# Patient Record
Sex: FEMALE | Race: WHITE | Employment: PART TIME | URBAN - METROPOLITAN AREA
[De-identification: names, ages, dates, MRNs, and addresses within clinical notes are randomized per-mention and may not be internally consistent; named-entity substitution may affect disease eponyms.]

---

## 2018-09-03 ENCOUNTER — HOSPITAL ENCOUNTER (OUTPATIENT)
Age: 63
Setting detail: OBSERVATION
Discharge: HOME | End: 2018-09-04
Attending: INTERNAL MEDICINE | Admitting: INTERNAL MEDICINE
Payer: COMMERCIAL

## 2018-09-03 ENCOUNTER — APPOINTMENT (OUTPATIENT)
Dept: CT IMAGING | Age: 63
End: 2018-09-03
Payer: COMMERCIAL

## 2018-09-03 DIAGNOSIS — S01.01XA LACERATION OF SCALP, INITIAL ENCOUNTER: ICD-10-CM

## 2018-09-03 DIAGNOSIS — R11.2 NAUSEA AND VOMITING, INTRACTABILITY OF VOMITING NOT SPECIFIED, UNSPECIFIED VOMITING TYPE: ICD-10-CM

## 2018-09-03 DIAGNOSIS — S09.90XA INJURY OF HEAD, INITIAL ENCOUNTER: ICD-10-CM

## 2018-09-03 DIAGNOSIS — F10.920 ALCOHOLIC INTOXICATION WITHOUT COMPLICATION (HCC): ICD-10-CM
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
## 2018-09-03 LAB
ALBUMIN SERPL-MCNC: 4.9 G/DL (ref 3.9–4.9)
ALP BLD-CCNC: 124 U/L (ref 40–130)
ALT SERPL-CCNC: 17 U/L (ref 0–33)
ANION GAP SERPL CALCULATED.3IONS-SCNC: 21 MEQ/L (ref 7–13)
APTT: 25.9 SEC (ref 21.6–35.4)
AST SERPL-CCNC: 19 U/L (ref 0–35)
BACTERIA: ABNORMAL /HPF
BASOPHILS ABSOLUTE: 0.1 K/UL (ref 0–0.2)
BASOPHILS RELATIVE PERCENT: 0.7 %
BILIRUB SERPL-MCNC: 0.4 MG/DL (ref 0–1.2)
BILIRUBIN URINE: NEGATIVE
BLOOD, URINE: ABNORMAL
BUN BLDV-MCNC: 9 MG/DL (ref 8–23)
CALCIUM SERPL-MCNC: 9.5 MG/DL (ref 8.6–10.2)
CHLORIDE BLD-SCNC: 95 MEQ/L (ref 98–107)
CLARITY: CLEAR
CO2: 20 MEQ/L (ref 22–29)
COLOR: YELLOW
CREAT SERPL-MCNC: 0.58 MG/DL (ref 0.5–0.9)
EKG ATRIAL RATE: 94 BPM
EKG P AXIS: 63 DEGREES
EKG P-R INTERVAL: 162 MS
EKG Q-T INTERVAL: 408 MS
EKG QRS DURATION: 86 MS
EKG QTC CALCULATION (BAZETT): 510 MS
EKG R AXIS: 17 DEGREES
EKG T AXIS: 57 DEGREES
EKG VENTRICULAR RATE: 94 BPM
EOSINOPHILS ABSOLUTE: 0.3 K/UL (ref 0–0.7)
EOSINOPHILS RELATIVE PERCENT: 2.5 %
EPITHELIAL CELLS, UA: ABNORMAL /HPF
ETHANOL PERCENT: 0.16 G/DL
ETHANOL: 181 MG/DL (ref 0–0.08)
GFR AFRICAN AMERICAN: >60
GFR NON-AFRICAN AMERICAN: >60
GLOBULIN: 2.9 G/DL (ref 2.3–3.5)
GLUCOSE BLD-MCNC: 118 MG/DL (ref 74–109)
GLUCOSE URINE: NEGATIVE MG/DL
HCT VFR BLD CALC: 43.8 % (ref 37–47)
HEMOGLOBIN: 14.7 G/DL (ref 12–16)
INR BLD: 1
KETONES, URINE: NEGATIVE MG/DL
LEUKOCYTE ESTERASE, URINE: NEGATIVE
LYMPHOCYTES ABSOLUTE: 3.5 K/UL (ref 1–4.8)
LYMPHOCYTES RELATIVE PERCENT: 32.1 %
MAGNESIUM: 1.7 MG/DL (ref 1.7–2.3)
MCH RBC QN AUTO: 30.1 PG (ref 27–31.3)
MCHC RBC AUTO-ENTMCNC: 33.6 % (ref 33–37)
MCV RBC AUTO: 89.5 FL (ref 82–100)
MONOCYTES ABSOLUTE: 0.6 K/UL (ref 0.2–0.8)
MONOCYTES RELATIVE PERCENT: 5.3 %
NEUTROPHILS ABSOLUTE: 6.5 K/UL (ref 1.4–6.5)
NEUTROPHILS RELATIVE PERCENT: 59.4 %
NITRITE, URINE: NEGATIVE
PDW BLD-RTO: 14.8 % (ref 11.5–14.5)
PH UA: 5 (ref 5–9)
PLATELET # BLD: 175 K/UL (ref 130–400)
POTASSIUM SERPL-SCNC: 3.1 MEQ/L (ref 3.5–5.1)
PROTEIN UA: NEGATIVE MG/DL
PROTHROMBIN TIME: 10.2 SEC (ref 9.6–12.3)
RBC # BLD: 4.89 M/UL (ref 4.2–5.4)
RBC UA: ABNORMAL /HPF (ref 0–2)
SODIUM BLD-SCNC: 136 MEQ/L (ref 132–144)
SPECIFIC GRAVITY UA: 1 (ref 1–1.03)
TOTAL PROTEIN: 7.8 G/DL (ref 6.4–8.1)
TROPONIN: <0.01 NG/ML (ref 0–0.01)
URINE REFLEX TO CULTURE: YES
UROBILINOGEN, URINE: 0.2 E.U./DL
WBC # BLD: 10.9 K/UL (ref 4.8–10.8)
WBC UA: ABNORMAL /HPF (ref 0–5)

## 2018-09-03 PROCEDURE — 85730 THROMBOPLASTIN TIME PARTIAL: CPT

## 2018-09-03 PROCEDURE — 70450 CT HEAD/BRAIN W/O DYE: CPT

## 2018-09-03 PROCEDURE — 2500000003 HC RX 250 WO HCPCS: Performed by: PHYSICIAN ASSISTANT

## 2018-09-03 PROCEDURE — 36415 COLL VENOUS BLD VENIPUNCTURE: CPT

## 2018-09-03 PROCEDURE — 85610 PROTHROMBIN TIME: CPT

## 2018-09-03 PROCEDURE — 96376 TX/PRO/DX INJ SAME DRUG ADON: CPT

## 2018-09-03 PROCEDURE — 87086 URINE CULTURE/COLONY COUNT: CPT

## 2018-09-03 PROCEDURE — 2580000003 HC RX 258: Performed by: PHYSICIAN ASSISTANT

## 2018-09-03 PROCEDURE — 83735 ASSAY OF MAGNESIUM: CPT

## 2018-09-03 PROCEDURE — 85025 COMPLETE CBC W/AUTO DIFF WBC: CPT

## 2018-09-03 PROCEDURE — 81001 URINALYSIS AUTO W/SCOPE: CPT

## 2018-09-03 PROCEDURE — 84484 ASSAY OF TROPONIN QUANT: CPT

## 2018-09-03 PROCEDURE — 80053 COMPREHEN METABOLIC PANEL: CPT

## 2018-09-03 PROCEDURE — G0480 DRUG TEST DEF 1-7 CLASSES: HCPCS

## 2018-09-03 PROCEDURE — 72125 CT NECK SPINE W/O DYE: CPT

## 2018-09-03 PROCEDURE — 6370000000 HC RX 637 (ALT 250 FOR IP): Performed by: PHYSICIAN ASSISTANT

## 2018-09-03 PROCEDURE — 96361 HYDRATE IV INFUSION ADD-ON: CPT

## 2018-09-03 PROCEDURE — 6360000002 HC RX W HCPCS: Performed by: PHYSICIAN ASSISTANT

## 2018-09-03 PROCEDURE — 99285 EMERGENCY DEPT VISIT HI MDM: CPT

## 2018-09-03 PROCEDURE — 93005 ELECTROCARDIOGRAM TRACING: CPT

## 2018-09-03 PROCEDURE — 96374 THER/PROPH/DIAG INJ IV PUSH: CPT

## 2018-09-03 RX ORDER — POTASSIUM BICARBONATE 25 MEQ/1
25 TABLET, EFFERVESCENT ORAL ONCE
Status: COMPLETED | OUTPATIENT
Start: 2018-09-03 | End: 2018-09-03

## 2018-09-03 RX ORDER — SODIUM CHLORIDE 0.9 % (FLUSH) 0.9 %
3 SYRINGE (ML) INJECTION EVERY 8 HOURS
Status: DISCONTINUED | OUTPATIENT
Start: 2018-09-03 | End: 2018-09-04 | Stop reason: ALTCHOICE

## 2018-09-03 RX ORDER — 0.9 % SODIUM CHLORIDE 0.9 %
500 INTRAVENOUS SOLUTION INTRAVENOUS ONCE
Status: COMPLETED | OUTPATIENT
Start: 2018-09-03 | End: 2018-09-04

## 2018-09-03 RX ORDER — LIDOCAINE HYDROCHLORIDE 20 MG/ML
5 INJECTION, SOLUTION INFILTRATION; PERINEURAL ONCE
Status: COMPLETED | OUTPATIENT
Start: 2018-09-03 | End: 2018-09-03

## 2018-09-03 RX ORDER — ONDANSETRON 2 MG/ML
4 INJECTION INTRAMUSCULAR; INTRAVENOUS ONCE
Status: COMPLETED | OUTPATIENT
Start: 2018-09-03 | End: 2018-09-03

## 2018-09-03 RX ORDER — 0.9 % SODIUM CHLORIDE 0.9 %
1000 INTRAVENOUS SOLUTION INTRAVENOUS ONCE
Status: COMPLETED | OUTPATIENT
Start: 2018-09-03 | End: 2018-09-04

## 2018-09-03 RX ADMIN — SODIUM CHLORIDE 1000 ML: 9 INJECTION, SOLUTION INTRAVENOUS at 22:14

## 2018-09-03 RX ADMIN — ONDANSETRON HYDROCHLORIDE 4 MG: 2 INJECTION, SOLUTION INTRAMUSCULAR; INTRAVENOUS at 22:14

## 2018-09-03 RX ADMIN — ONDANSETRON 4 MG: 2 INJECTION INTRAMUSCULAR; INTRAVENOUS at 19:24

## 2018-09-03 RX ADMIN — Medication 3 ML: at 19:26

## 2018-09-03 RX ADMIN — SODIUM CHLORIDE 500 ML: 9 INJECTION, SOLUTION INTRAVENOUS at 19:24

## 2018-09-03 RX ADMIN — POTASSIUM BICARBONATE 25 MEQ: 25 TABLET, EFFERVESCENT ORAL at 23:08

## 2018-09-03 RX ADMIN — LIDOCAINE HYDROCHLORIDE 5 ML: 20 INJECTION, SOLUTION INFILTRATION; PERINEURAL at 23:09

## 2018-09-03 ASSESSMENT — ENCOUNTER SYMPTOMS
SHORTNESS OF BREATH: 0
VOICE CHANGE: 0
EYE DISCHARGE: 0
BACK PAIN: 0
VOMITING: 0
ABDOMINAL DISTENTION: 0
APNEA: 0
ANAL BLEEDING: 0
ABDOMINAL PAIN: 0
PHOTOPHOBIA: 0
NAUSEA: 1

## 2018-09-03 ASSESSMENT — PAIN SCALES - GENERAL
PAINLEVEL_OUTOF10: 10
PAINLEVEL_OUTOF10: 5
PAINLEVEL_OUTOF10: 5

## 2018-09-03 ASSESSMENT — PAIN DESCRIPTION - PAIN TYPE
TYPE: ACUTE PAIN
TYPE: ACUTE PAIN

## 2018-09-03 ASSESSMENT — PAIN DESCRIPTION - DESCRIPTORS: DESCRIPTORS: ACHING

## 2018-09-03 ASSESSMENT — PAIN DESCRIPTION - LOCATION
LOCATION: HEAD
LOCATION: HEAD

## 2018-09-03 ASSESSMENT — PAIN DESCRIPTION - ORIENTATION: ORIENTATION: POSTERIOR

## 2018-09-03 NOTE — ED PROVIDER NOTES
Types: Cigarettes    Smokeless tobacco: Never Used    Alcohol use Yes      Comment: daily drinker until 4 years ago, drinks a lot socially    Drug use: No    Sexual activity: Not Asked     Other Topics Concern    None     Social History Narrative    None       SCREENINGS   NIH Stroke Scale  NIH Stroke Scale Assessed: Yes  Interval: Baseline  Level of Consciousness (1a. ): Alert  LOC Questions (1b. ): Answers both correctly  LOC Commands (1c. ): Obeys both correctly  Best Gaze (2. ): Normal  Visual (3. ): No visual loss  Facial Palsy (4. ): Normal  Motor Arm, Left (5a. ): No drift  Motor Arm, Right (5b. ): No drift  Motor Leg, Left (6a. ): No drift  Motor Leg, Right (6b. ): No drift  Limb Ataxia (7. ): Absent  Sensory (8. ): Normal  Best Language (9. ): No aphasia  Dysarthria (10. ): Normal  Extinction and Inattention (11): No neglect  Total: 0Glasgow Coma Scale  Eye Opening: Spontaneous  Best Verbal Response: Oriented  Best Motor Response: Obeys commands  [place]ord Coma Scale Score: 15        PHYSICAL EXAM    (up to 7 for level 4, 8 or more for level 5)     ED Triage Vitals   BP Temp Temp src Pulse Resp SpO2 Height Weight   -- -- -- -- -- -- -- --       Physical Exam   Constitutional: She appears well-developed and well-nourished. No distress. HENT:   Head: Normocephalic and atraumatic. Eyes: Pupils are equal, round, and reactive to light. Right eye exhibits no discharge. Left eye exhibits no discharge. Neck: Normal range of motion. Neck supple. Cardiovascular: Normal rate, regular rhythm, normal heart sounds and intact distal pulses. Pulmonary/Chest: Effort normal and breath sounds normal. No stridor. No respiratory distress. She has no wheezes. Abdominal: Soft. Bowel sounds are normal. She exhibits no distension. There is no tenderness. Musculoskeletal:   Patient currently wearing c-collar. Negative thoracic tenderness.   Negative hip tenderness negative leg tenderness full range of motion 4 procedure: Tolerated well, no immediate complications  Comments:      Technique utilized        FINAL IMPRESSION      1. Fall, initial encounter    2. Alcoholic intoxication without complication ([facility]    3. Nausea and vomiting, intractability of vomiting not specified, unspecified vomiting type    4. Injury of head, initial encounter    5.  Laceration of scalp, initial encounter          DISPOSITION/PLAN   DISPOSITION Admitted 09/04/2018 01:04:24 AM      PATIENT REFERRED TO:  [address] [place] [address],[address] [facility], [address]  [place] [id number]    In 1 week        DISCHARGE MEDICATIONS:  Discharge Medication List as of 9/4/2018  2:18 PM             (Please note that portions of this note were completed with a voice recognition program.  Efforts were made to edit the dictations but occasionally words are mis-transcribed.)    Peyman Hendricks PA-C (electronically signed)  Attending Emergency Physician         Peyman Hendricks PA-C  09/08/18 [address] Ambar Anderson PA-C  10/17/18 0423

## 2018-09-04 VITALS
HEIGHT: 60 IN | BODY MASS INDEX: 27.29 KG/M2 | SYSTOLIC BLOOD PRESSURE: 136 MMHG | TEMPERATURE: 98.6 F | HEART RATE: 90 BPM | WEIGHT: 139 LBS | RESPIRATION RATE: 18 BRPM | DIASTOLIC BLOOD PRESSURE: 87 MMHG | OXYGEN SATURATION: 97 %

## 2018-09-04 PROBLEM — E87.6 HYPOKALEMIA: Status: ACTIVE | Noted: 2018-09-04

## 2018-09-04 PROBLEM — F10.10 ETOH ABUSE: Status: ACTIVE | Noted: 2018-09-04
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
## 2018-09-04 PROBLEM — R11.2 NAUSEA WITH VOMITING: Status: ACTIVE | Noted: 2018-09-04

## 2018-09-04 PROBLEM — R42 DIZZINESS: Status: ACTIVE | Noted: 2018-09-04

## 2018-09-04 LAB
ANION GAP SERPL CALCULATED.3IONS-SCNC: 12 MEQ/L (ref 7–13)
BUN BLDV-MCNC: 11 MG/DL (ref 8–23)
CALCIUM SERPL-MCNC: 8.8 MG/DL (ref 8.6–10.2)
CHLORIDE BLD-SCNC: 107 MEQ/L (ref 98–107)
CO2: 22 MEQ/L (ref 22–29)
CREAT SERPL-MCNC: 0.62 MG/DL (ref 0.5–0.9)
GFR AFRICAN AMERICAN: >60
GFR NON-AFRICAN AMERICAN: >60
GLUCOSE BLD-MCNC: 111 MG/DL (ref 60–115)
GLUCOSE BLD-MCNC: 141 MG/DL (ref 60–115)
GLUCOSE BLD-MCNC: 151 MG/DL (ref 74–109)
HBA1C MFR BLD: 9.3 % (ref 4.8–5.9)
HCT VFR BLD CALC: 39.8 % (ref 37–47)
HEMOGLOBIN: 13.4 G/DL (ref 12–16)
MCH RBC QN AUTO: 30.2 PG (ref 27–31.3)
MCHC RBC AUTO-ENTMCNC: 33.6 % (ref 33–37)
MCV RBC AUTO: 90.1 FL (ref 82–100)
PDW BLD-RTO: 15.1 % (ref 11.5–14.5)
PERFORMED ON: ABNORMAL
PERFORMED ON: NORMAL
PLATELET # BLD: 166 K/UL (ref 130–400)
POTASSIUM REFLEX MAGNESIUM: 4.2 MEQ/L (ref 3.5–5.1)
RBC # BLD: 4.42 M/UL (ref 4.2–5.4)
SODIUM BLD-SCNC: 141 MEQ/L (ref 132–144)
WBC # BLD: 8.7 K/UL (ref 4.8–10.8)

## 2018-09-04 PROCEDURE — 96376 TX/PRO/DX INJ SAME DRUG ADON: CPT

## 2018-09-04 PROCEDURE — G0378 HOSPITAL OBSERVATION PER HR: HCPCS

## 2018-09-04 PROCEDURE — S0028 INJECTION, FAMOTIDINE, 20 MG: HCPCS | Performed by: HOSPITALIST

## 2018-09-04 PROCEDURE — 2500000003 HC RX 250 WO HCPCS: Performed by: HOSPITALIST

## 2018-09-04 PROCEDURE — 6360000002 HC RX W HCPCS: Performed by: INTERNAL MEDICINE

## 2018-09-04 PROCEDURE — 96375 TX/PRO/DX INJ NEW DRUG ADDON: CPT

## 2018-09-04 PROCEDURE — 85027 COMPLETE CBC AUTOMATED: CPT

## 2018-09-04 PROCEDURE — 2580000003 HC RX 258: Performed by: HOSPITALIST

## 2018-09-04 PROCEDURE — 80048 BASIC METABOLIC PNL TOTAL CA: CPT

## 2018-09-04 PROCEDURE — 6370000000 HC RX 637 (ALT 250 FOR IP): Performed by: HOSPITALIST

## 2018-09-04 PROCEDURE — 36415 COLL VENOUS BLD VENIPUNCTURE: CPT

## 2018-09-04 PROCEDURE — 96372 THER/PROPH/DIAG INJ SC/IM: CPT

## 2018-09-04 PROCEDURE — 93010 ELECTROCARDIOGRAM REPORT: CPT | Performed by: INTERNAL MEDICINE

## 2018-09-04 PROCEDURE — 6360000002 HC RX W HCPCS: Performed by: HOSPITALIST

## 2018-09-04 PROCEDURE — 83036 HEMOGLOBIN GLYCOSYLATED A1C: CPT

## 2018-09-04 RX ORDER — GLIPIZIDE 5 MG/1
5 TABLET ORAL DAILY
COMMUNITY

## 2018-09-04 RX ORDER — SODIUM CHLORIDE 9 MG/ML
INJECTION, SOLUTION INTRAVENOUS CONTINUOUS
Status: DISCONTINUED | OUTPATIENT
Start: 2018-09-04 | End: 2018-09-04 | Stop reason: HOSPADM

## 2018-09-04 RX ORDER — METFORMIN HYDROCHLORIDE 500 MG/1
500 TABLET, EXTENDED RELEASE ORAL 2 TIMES DAILY
COMMUNITY

## 2018-09-04 RX ORDER — LISINOPRIL 20 MG/1
20 TABLET ORAL DAILY
COMMUNITY

## 2018-09-04 RX ORDER — METOPROLOL TARTRATE 50 MG/1
50 TABLET, FILM COATED ORAL 2 TIMES DAILY
COMMUNITY

## 2018-09-04 RX ORDER — ONDANSETRON 2 MG/ML
4 INJECTION INTRAMUSCULAR; INTRAVENOUS EVERY 6 HOURS PRN
Status: DISCONTINUED | OUTPATIENT
Start: 2018-09-04 | End: 2018-09-04 | Stop reason: HOSPADM

## 2018-09-04 RX ORDER — NICOTINE 21 MG/24HR
1 PATCH, TRANSDERMAL 24 HOURS TRANSDERMAL DAILY
Status: DISCONTINUED | OUTPATIENT
Start: 2018-09-04 | End: 2018-09-04 | Stop reason: HOSPADM

## 2018-09-04 RX ORDER — SODIUM CHLORIDE 0.9 % (FLUSH) 0.9 %
10 SYRINGE (ML) INJECTION EVERY 12 HOURS SCHEDULED
Status: DISCONTINUED | OUTPATIENT
Start: 2018-09-04 | End: 2018-09-04 | Stop reason: HOSPADM

## 2018-09-04 RX ORDER — MECLIZINE HYDROCHLORIDE 25 MG/1
25 TABLET ORAL 3 TIMES DAILY PRN
Status: DISCONTINUED | OUTPATIENT
Start: 2018-09-04 | End: 2018-09-04 | Stop reason: HOSPADM

## 2018-09-04 RX ORDER — SODIUM CHLORIDE 0.9 % (FLUSH) 0.9 %
10 SYRINGE (ML) INJECTION PRN
Status: DISCONTINUED | OUTPATIENT
Start: 2018-09-04 | End: 2018-09-04 | Stop reason: HOSPADM

## 2018-09-04 RX ORDER — POTASSIUM CHLORIDE 7.45 MG/ML
10 INJECTION INTRAVENOUS
Status: DISPENSED | OUTPATIENT
Start: 2018-09-04 | End: 2018-09-04

## 2018-09-04 RX ORDER — DEXTROSE MONOHYDRATE 25 G/50ML
12.5 INJECTION, SOLUTION INTRAVENOUS PRN
Status: DISCONTINUED | OUTPATIENT
Start: 2018-09-04 | End: 2018-09-04 | Stop reason: HOSPADM

## 2018-09-04 RX ORDER — NICOTINE POLACRILEX 4 MG
15 LOZENGE BUCCAL PRN
Status: DISCONTINUED | OUTPATIENT
Start: 2018-09-04 | End: 2018-09-04 | Stop reason: HOSPADM

## 2018-09-04 RX ORDER — ACETAMINOPHEN 325 MG/1
650 TABLET ORAL EVERY 4 HOURS PRN
Status: DISCONTINUED | OUTPATIENT
Start: 2018-09-04 | End: 2018-09-04 | Stop reason: HOSPADM

## 2018-09-04 RX ORDER — PROMETHAZINE HYDROCHLORIDE 25 MG/ML
6.25 INJECTION, SOLUTION INTRAMUSCULAR; INTRAVENOUS EVERY 6 HOURS PRN
Status: DISCONTINUED | OUTPATIENT
Start: 2018-09-04 | End: 2018-09-04 | Stop reason: HOSPADM

## 2018-09-04 RX ORDER — DEXTROSE MONOHYDRATE 50 MG/ML
100 INJECTION, SOLUTION INTRAVENOUS PRN
Status: DISCONTINUED | OUTPATIENT
Start: 2018-09-04 | End: 2018-09-04 | Stop reason: HOSPADM

## 2018-09-04 RX ORDER — PROMETHAZINE HYDROCHLORIDE 25 MG/ML
12.5 INJECTION, SOLUTION INTRAMUSCULAR; INTRAVENOUS ONCE
Status: COMPLETED | OUTPATIENT
Start: 2018-09-04 | End: 2018-09-04

## 2018-09-04 RX ADMIN — POTASSIUM CHLORIDE 10 MEQ: 7.46 INJECTION, SOLUTION INTRAVENOUS at 08:08

## 2018-09-04 RX ADMIN — POTASSIUM CHLORIDE 10 MEQ: 7.46 INJECTION, SOLUTION INTRAVENOUS at 09:25

## 2018-09-04 RX ADMIN — INSULIN LISPRO 1 UNITS: 100 INJECTION, SOLUTION INTRAVENOUS; SUBCUTANEOUS at 13:17

## 2018-09-04 RX ADMIN — PROMETHAZINE HYDROCHLORIDE 12.5 MG: 25 INJECTION INTRAMUSCULAR; INTRAVENOUS at 03:15

## 2018-09-04 RX ADMIN — POTASSIUM CHLORIDE 10 MEQ: 7.46 INJECTION, SOLUTION INTRAVENOUS at 06:27

## 2018-09-04 RX ADMIN — POTASSIUM CHLORIDE 10 MEQ: 7.46 INJECTION, SOLUTION INTRAVENOUS at 05:13

## 2018-09-04 RX ADMIN — MECLIZINE HYDROCHLORIDE 25 MG: 25 TABLET ORAL at 03:15

## 2018-09-04 RX ADMIN — SODIUM CHLORIDE: 9 INJECTION, SOLUTION INTRAVENOUS at 03:16

## 2018-09-04 RX ADMIN — FAMOTIDINE 20 MG: 10 INJECTION, SOLUTION INTRAVENOUS at 11:08

## 2018-09-04 RX ADMIN — POTASSIUM CHLORIDE 10 MEQ: 7.46 INJECTION, SOLUTION INTRAVENOUS at 03:36

## 2018-09-04 ASSESSMENT — PAIN SCALES - GENERAL
PAINLEVEL_OUTOF10: 0
PAINLEVEL_OUTOF10: 0

## 2018-09-04 NOTE — ED NOTES
Pt unable to drink potassium supplement. LUIS Kathleen notified. Pt emesis x 1. Telemonitor placed verified by monitor room.      Julius Olvera, RN  03/95/48 [address] [place] [address], RN  89/19/74 [id number]

## 2018-09-04 NOTE — H&P
[facility]ICINE    HISTORY AND PHYSICAL EXAM    PATIENT NAME:  Smith Mchugh    MRN:  [id number]  SERVICE DATE:  9/4/2018   SERVICE TIME:  2:07 AM    Primary Care Physician: No primary care provider on file. SUBJECTIVE  CHIEF COMPLAINT:  Fall with head injury and positive LOC    HPI:  This is a 66 y.o. female who presents with significant PMH of CVA, patient unsure how many strokes she has had, MI, patient also unsure how many MIs she has had but knows she has 1 stent, patient also has history of brain aneurysm and she states it needs surgery but she is scared, patient also has type 2 diabetes diagnosed 2 years ago which she states is uncontrolled, as well as HTN and HLD, patient states she used to be an alcoholic up until 4 years ago when she all of a sudden quit but states she still has multiple drinks when she drinks socially, patient also has 52.5 pack years smoking history but denies prior diagnosis of COPD; patient presented to the ER tonight per EMS after drinking about 6 cocktails and falling and hitting the back of her head with positive LOC. Patient does not remember the incident, unsure whether patient had positive LOC and then she fell or if the fall caused her positive LOC. It occurred earlier tonight on the boat with family, family is no longer present to discuss specifics. Patient currently denies headache but has significant amount of dizziness and nausea with emesis associated with any change in position. 3 staples were inserted in the ER in the occipital region. Per ER physician, patient also had some speech difficulty on arrival, that is no longer present. Patient states she felt fine before the incident, again she does not remember the incident. Patient's alcohol level reveals she is intoxicated.   Patient was going to be discharged by given that the patient is on aspirin, has a history of brain aneurysm that has not been repaired, in the fact that she has uncontrollable dizziness and nausea, decision was made to admit patient for observation. Patient denies recent illness, fevers, chills, palpitations, SOB, chest pain, dysuria, abdominal pain, diarrhea, numbness or tingling, denies blurred or double vision. PAST MEDICAL HISTORY:    Past Medical History:   Diagnosis Date    Brain aneurysm     needs surgery    CVA (cerebral vascular accident) ([facility] [address]     a  few, unsure how many    Diabetes mellitus ([facility]     type 2, diagnosed in 2016    HLD (hyperlipidemia)     Hypertension     MI (myocardial infarction) ([facility]     a few, unsure how many     PAST SURGICAL HISTORY:    Past Surgical History:   Procedure Laterality Date    CORONARY ANGIOPLASTY WITH STENT PLACEMENT      1  stent    TUBAL LIGATION       FAMILY HISTORY:    Family History   Problem Relation Age of Onset    Cancer Mother         breast and lung    Heart Attack Mother     Cirrhosis Father     Hypertension Father     Heart Attack Brother      SOCIAL HISTORY:    Social History     Social History    Marital status: Single     Spouse name: N/A    Number of children: N/A    Years of education: N/A     Occupational History    Not on file. Social History Main Topics    Smoking status: Current Some Day Smoker     Packs/day: 1.50     Years: 35.00     Types: Cigarettes    Smokeless tobacco: Never Used    Alcohol use Yes      Comment: daily drinker until 4 years ago, drinks a lot socially    Drug use: No    Sexual activity: Not on file     Other Topics Concern    Not on file     Social History Narrative    No narrative on file     MEDICATIONS:   Prior to Admission medications    Not on File       ALLERGIES: Sulfa antibiotics    REVIEW OF SYSTEM:   ROS as noted in HPI, 12 point ROS reviewed and otherwise negative.     OBJECTIVE  PHYSICAL EXAM: /82   Pulse 82   Temp 97.4 °F (36.3 °C) (Oral)   Resp 18   Ht 5' (1.524 m)   Wt 139 lb (63 kg)   SpO2 98%   BMI 27.15 kg/m² 37.0 - 47.0 %    MCV 89.5 82.0 - 100.0 fL    MCH 30.1 27.0 - 31.3 pg    MCHC 33.6 33.0 - 37.0 %    RDW 14.8 (H) 11.5 - 14.5 %    Platelets 803 265 - 961 K/uL    Neutrophils % 59.4 %    Lymphocytes % 32.1 %    Monocytes % 5.3 %    Eosinophils % 2.5 %    Basophils % 0.7 %    Neutrophils # 6.5 1.4 - 6.5 K/uL    Lymphocytes # 3.5 1.0 - 4.8 K/uL    Monocytes # 0.6 0.2 - 0.8 K/uL    Eosinophils # 0.3 0.0 - 0.7 K/uL    Basophils # 0.1 0.0 - 0.2 K/uL   Magnesium    Collection Time: 09/03/18  7:00 PM   Result Value Ref Range    Magnesium 1.7 1.7 - 2.3 mg/dL   Urine Reflex to Culture    Collection Time: 09/03/18  7:00 PM   Result Value Ref Range    Color, UA Yellow Straw/Yellow    Clarity, UA Clear Clear    Glucose, Ur Negative Negative mg/dL    Bilirubin Urine Negative Negative    Ketones, Urine Negative Negative mg/dL    Specific Gravity, UA 1.005 1.005 - 1.030    Blood, Urine TRACE (A) Negative    pH, UA 5.0 5.0 - 9.0    Protein, UA Negative Negative mg/dL    Urobilinogen, Urine 0.2 <2.0 E.U./dL    Nitrite, Urine Negative Negative    Leukocyte Esterase, Urine Negative Negative    Urine Reflex to Culture YES    Protime-INR    Collection Time: 09/03/18  7:00 PM   Result Value Ref Range    Protime 10.2 9.6 - 12.3 sec    INR 1.0    APTT    Collection Time: 09/03/18  7:00 PM   Result Value Ref Range    aPTT 25.9 21.6 - 35.4 sec   Troponin    Collection Time: 09/03/18  7:00 PM   Result Value Ref Range    Troponin <0.010 0.000 - 0.010 ng/mL   Microscopic Urinalysis    Collection Time: 09/03/18  7:00 PM   Result Value Ref Range    WBC, UA 0-2 0 - 5 /HPF    RBC, UA 3-5 (A) 0 - 2 /HPF    Epi Cells 0-2 /HPF    Bacteria, UA Rare /HPF   EKG 12 Lead - Chest Pain    Collection Time: 09/03/18  7:30 PM   Result Value Ref Range    Ventricular Rate 94 BPM    Atrial Rate 94 BPM    P-R Interval 162 ms    QRS Duration 86 ms    Q-T Interval 408 ms    QTc Calculation (Bazett) 510 ms    P Axis 63 degrees    R Axis 17 degrees    T Axis 57 degrees IMAGING:  No results found. VTE Prophylaxis: low molecular weight heparin -  start    [address]  Patient Active Hospital Problem List:   Fall (9/4/2018)    Assessment: Fall likely secondary to intoxication, positive LOC with head injury, the staples and occipital region, concerning for concussion given dizziness and nausea and vomiting     Plan:   Admit for observation  Neuro checks  Fall precautions      Dizziness (9/4/2018)    Assessment: Concern for concussion     Plan:   Neuro checks   Operative assistance only   Start Antivert as needed      Nausea with vomiting (9/4/2018)    Assessment: Likely related to alcohol intoxication, still concern for concussion due to head injury    Plan:    Zofran as needed   We will add low dose Phenergan as needed if the Zofran does not work      Hypokalemia (9/4/2018)    Assessment: Likely related to alcohol use     Plan:   IV replacement since patient having poor oral intake due to nausea   Repeat BMP in a.m. Tobacco Abuse  Assessment: Patient has 52.5 packs per year smoking history  Plan:  Nicotine patch  Cessation education and counseling     Hypertension ()    Assessment: Chronic     Plan:   Monitor and resume home meds      HLD (hyperlipidemia) ()    Assessment: Chronic     Plan:   Resume home meds    Diabetes mellitus ([facility] [address] ()    Assessment: Patient states it is poorly controlled     Plan:   Hemoglobin A1c in a.m. Accu-Cheks before meals and at bedtime with SSI  Resume home meds           Plan of care discussed with: patient    SIGNATURE: BRYON Laguna CNP  DATE: September 4, 2018  TIME: 2:07 AM     Electronically signed by BRYON Laguna CNP on 9/4/2018 at 2:25 AM    Dr. Luis Hodge MD - supervising physician    Attending's Note:  Pt was seen and evaluated and discussed care with NP. I agree with the above assessment and plan. Patient was drinking alcohol on a boat she lost her balance or slipped and fell and hit her head.  Patient needed multiple sutures to the back of her head. She is complaining of dizziness with any changes in position she is also complaining of nausea and vomiting whenever she gets dizzy. CT head negative. ?concussion vs alcohol intoxication related symptoms. Will treat symptomatically. If symptoms do not improve by the morning will repeat CT and consult neurology.      Jeet Rodriguez, [address] [facility]

## 2018-09-04 NOTE — PROGRESS NOTES
Hospitalist Progress Note      PCP: No primary care provider on file. Date of Admission: 9/3/2018    Chief Complaint:  Patient seen and examined, feels better today, nausea and vomiting resolved, dizziness still present but improving    Medications:  Reviewed    Infusion Medications    sodium chloride 100 mL/hr at 09/04/18 0316    dextrose       Scheduled Medications    sodium chloride flush  10 mL Intravenous 2 times per day    enoxaparin  40 mg Subcutaneous Daily    famotidine (PEPCID) injection  20 mg Intravenous BID    nicotine  1 patch Transdermal Daily    insulin lispro  0-6 Units Subcutaneous TID WC    insulin lispro  0-3 Units Subcutaneous Nightly     PRN Meds: sodium chloride flush, magnesium hydroxide, ondansetron, acetaminophen, glucose, dextrose, glucagon (rDNA), dextrose, meclizine, promethazine      Intake/Output Summary (Last 24 hours) at 09/04/18 1154  Last data filed at 09/04/18 0945   Gross per 24 hour   Intake             1740 ml   Output                0 ml   Net             1740 ml       Exam:    /78   Pulse 84   Temp 98.7 °F (37.1 °C) (Oral)   Resp 16   Ht 5' (1.524 m)   Wt 139 lb (63 kg)   SpO2 93%   BMI 27.15 kg/m²     General appearance: No apparent distress, appears stated age and cooperative. Respiratory:  Clear to auscultation, bilaterally without Rales/Wheezes/Rhonchi. Cardiovascular: Regular rate and rhythm with normal S1/S2. Abdomen: Soft, non-tender, non-distended with normal bowel sounds. Musculoskeletal: No clubbing, cyanosis or edema bilaterally.       Labs:   Recent Labs      09/03/18 1900   WBC  10.9*   HGB  14.7   HCT  43.8   PLT  175     Recent Labs      09/03/18 1900   NA  136   K  3.1*   CL  95*   CO2  20*   BUN  9   CREATININE  0.58   CALCIUM  9.5     Recent Labs      09/03/18 1900   AST  19   ALT  17   BILITOT  0.4   ALKPHOS  124     Recent Labs      09/03/18 1900   INR  1.0     Recent Labs      09/03/18 1900   TROPONINI  <0.010

## 2018-09-04 NOTE — DISCHARGE SUMMARY
Hospital Medicine Discharge Summary    Donna Mchugh  :  1955  MRN:  [id number]    Admit date:  9/3/2018  Discharge date:  2018    Admitting Physician: Lon Barone MD  Primary Care Physician:  No primary care provider on file. Discharge Diagnoses:    Principal Problem:    Fall  Active Problems:    ETOH abuse    Hypokalemia    Hypertension    HLD (hyperlipidemia)    Diabetes mellitus (HCC)    Dizziness    Nausea with vomiting  Resolved Problems:    * No resolved hospital problems. *      Hospital Course:   Donna Mchugh is a 66 y.o. female that was admitted and treated at [facility] for the following medical issues:     Syncope with nausea,vomiting,dizziness s/p Fall with head injury  - consistent with concussion    - CT head and neck was negative for acute findings  - clinically improved, OK to d/c with close f/u with PCP    Hypokalemia   - replaced       Brain aneurysm  - needs surgery, still has not decided to have it done            Patient was seen by the following consultants while admitted to [facility]:   Consults:  [place] TO HOSPITALIST    Significant Diagnostic Studies:    Ct Head Wo Contrast    Result Date: 2018  CT Brain Contrast medium:  Not utilized. History:  Fall. Hit back of head. Loss of consciousness. Comparison:  None Findings: Extra-axial spaces:  Normal. Intracranial hemorrhage:  None. Ventricular system: Ventricles mildly enlarged. Sulci mildly prominent. Basal Cisterns:  Normal. Cerebral Parenchyma: A 3 mm focal area of decreased attenuation right centrum semiovale, exerting no mass effect. Similar finding just posterior to anterior horn left lateral ventricle at level of sylvian fissure. Midline Shift:  None. Cerebellum:  Normal. Paranasal sinuses and mastoid air cells:  Normal. Visualized Orbits:  Normal.     Impression: Remote bilateral infarcts. Mild cerebral atrophy. No acute findings.  All CT scans at this facility use dose modulation, iterative reconstruction, and/or weight based dosing when appropriate to reduce radiation dose to as low as reasonably achievable. Ct Cervical Spine Wo Contrast    Result Date: 9/4/2018  CT cervical spine without intravenous contrast medium. HISTORY: Merwyn Bombard backwards. Hit head. Loss of consciousness. Technical factors: CT imaging cervical spine obtained and formatted as 2.5 mm contiguous axial images from skull base to T2-T3. 2-D sagittal coronal reconstruction obtained during postprocessing. No intravenous contrast medium utilized. No prior CT imaging cervical spine available for comparison. FINDINGS: Cervical vertebral bodies are normal in height and alignment. Disc space narrowing C3-4 through C6-7 with anterior osteophytes C3-C6 and posterior osteophytes C4-C6. No prevertebral soft tissue swelling. Mastoid air cells well aerated. No fracture, dislocation, bone lesion. Limited imaging lung apices demonstrates right apical blebs. No fracture. At least moderate cervical spine. All CT scans at this facility use dose modulation, iterative reconstruction, and/or weight based dosing when appropriate to reduce radiation dose to as low as reasonably achievable. Discharge Medications:       May, [place] [address] Medication Instructions KAIT:[id number]    Printed on:09/04/18 6989   Medication Information                      glipiZIDE (GLUCOTROL) 5 MG tablet  Take 5 mg by mouth daily             lisinopril (PRINIVIL;ZESTRIL) 20 MG tablet  Take 20 mg by mouth daily             metFORMIN (GLUCOPHAGE-XR) 500 MG extended release tablet  Take 500 mg by mouth 2 times daily             metoprolol tartrate (LOPRESSOR) 50 MG tablet  Take 50 mg by mouth 2 times daily                 Disposition:   Discharged to Home. Any [facility] needs that were indicated and/or required as been addressed and set up by Social Work.      Condition at discharge: Pt was medically stable at the time of

## 2018-09-05 LAB — URINE CULTURE, ROUTINE: NORMAL
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
